# Patient Record
Sex: FEMALE | Race: WHITE
[De-identification: names, ages, dates, MRNs, and addresses within clinical notes are randomized per-mention and may not be internally consistent; named-entity substitution may affect disease eponyms.]

---

## 2017-08-16 PROBLEM — Z00.00 ENCOUNTER FOR PREVENTIVE HEALTH EXAMINATION: Status: ACTIVE | Noted: 2017-08-16

## 2017-08-17 ENCOUNTER — APPOINTMENT (OUTPATIENT)
Dept: ORTHOPEDIC SURGERY | Facility: CLINIC | Age: 42
End: 2017-08-17

## 2017-08-17 ENCOUNTER — APPOINTMENT (OUTPATIENT)
Dept: ORTHOPEDIC SURGERY | Facility: CLINIC | Age: 42
End: 2017-08-17
Payer: COMMERCIAL

## 2017-08-17 DIAGNOSIS — Z87.891 PERSONAL HISTORY OF NICOTINE DEPENDENCE: ICD-10-CM

## 2017-08-17 DIAGNOSIS — F41.9 ANXIETY DISORDER, UNSPECIFIED: ICD-10-CM

## 2017-08-17 DIAGNOSIS — Z82.49 FAMILY HISTORY OF ISCHEMIC HEART DISEASE AND OTHER DISEASES OF THE CIRCULATORY SYSTEM: ICD-10-CM

## 2017-08-17 PROCEDURE — 73620 X-RAY EXAM OF FOOT: CPT | Mod: LT

## 2017-08-17 PROCEDURE — 73610 X-RAY EXAM OF ANKLE: CPT | Mod: LT

## 2017-08-17 PROCEDURE — 99203 OFFICE O/P NEW LOW 30 MIN: CPT | Mod: 25

## 2017-08-17 PROCEDURE — 11055 PARING/CUTG B9 HYPRKER LES 1: CPT | Mod: LT

## 2017-08-17 RX ORDER — AZITHROMYCIN 250 MG/1
250 TABLET, FILM COATED ORAL
Qty: 6 | Refills: 0 | Status: COMPLETED | COMMUNITY
Start: 2017-05-19

## 2017-08-23 ENCOUNTER — APPOINTMENT (OUTPATIENT)
Dept: ORTHOPEDIC SURGERY | Facility: CLINIC | Age: 42
End: 2017-08-23

## 2017-08-29 ENCOUNTER — APPOINTMENT (OUTPATIENT)
Dept: ORTHOPEDIC SURGERY | Facility: CLINIC | Age: 42
End: 2017-08-29

## 2017-09-11 ENCOUNTER — APPOINTMENT (OUTPATIENT)
Dept: ORTHOPEDIC SURGERY | Facility: CLINIC | Age: 42
End: 2017-09-11
Payer: COMMERCIAL

## 2017-09-11 PROCEDURE — 99214 OFFICE O/P EST MOD 30 MIN: CPT

## 2017-09-11 PROCEDURE — 73630 X-RAY EXAM OF FOOT: CPT | Mod: LT

## 2017-10-10 ENCOUNTER — APPOINTMENT (OUTPATIENT)
Dept: ORTHOPEDIC SURGERY | Facility: CLINIC | Age: 42
End: 2017-10-10

## 2019-07-31 ENCOUNTER — APPOINTMENT (OUTPATIENT)
Dept: ORTHOPEDIC SURGERY | Facility: CLINIC | Age: 44
End: 2019-07-31
Payer: COMMERCIAL

## 2019-08-02 ENCOUNTER — APPOINTMENT (OUTPATIENT)
Dept: ORTHOPEDIC SURGERY | Facility: CLINIC | Age: 44
End: 2019-08-02
Payer: COMMERCIAL

## 2019-08-02 PROCEDURE — 73630 X-RAY EXAM OF FOOT: CPT | Mod: LT

## 2019-08-02 PROCEDURE — 99214 OFFICE O/P EST MOD 30 MIN: CPT

## 2019-08-02 RX ORDER — DOXYCYCLINE HYCLATE 100 MG/1
100 CAPSULE ORAL
Qty: 14 | Refills: 0 | Status: COMPLETED | COMMUNITY
Start: 2019-03-14 | End: 2019-08-02

## 2019-08-02 RX ORDER — CLINDAMYCIN HYDROCHLORIDE 300 MG/1
300 CAPSULE ORAL
Qty: 21 | Refills: 0 | Status: COMPLETED | COMMUNITY
Start: 2019-03-11 | End: 2019-08-02

## 2019-08-02 RX ORDER — HYDROCODONE BITARTRATE AND ACETAMINOPHEN 5; 325 MG/1; MG/1
5-325 TABLET ORAL
Qty: 16 | Refills: 0 | Status: COMPLETED | COMMUNITY
Start: 2019-03-14 | End: 2019-08-02

## 2019-08-02 NOTE — DISCUSSION/SUMMARY
[de-identified] : 43-year-old with prior cuboid fracture in the past now with No recent pain in the LEFT lateral hindfoot. I think she has some peroneal tendinopathy. There could be some aggravation of the calcaneocuboid arthritis. I do not suspect any new fractures.\par I suggested trying the brace wearing sneakers or a good supportive shoe. Warm soaks and ice and elevate as needed.\par Ibuprofen 400 mg 2-3 times a day temporarily.\par Followup in 3-4 weeks.

## 2019-08-02 NOTE — PROCEDURE
[de-identified] : She was fitted for an ASO type ankle brace for support which she found good and comfortable

## 2019-08-02 NOTE — HISTORY OF PRESENT ILLNESS
[de-identified] : Ms. Forbes comes in for Recurrent pain in her LEFT foot.\par On Tuesday she walked in a flat shoe and had pain in the left lateral foot and ankle and heel to the lateral distal 5th metatarsal.\par She is using a crutch to help her walk. She feels better walking barefoot than in a shoe.\par She had done a lot of PT after I saw her 2 yrs ago. She would have a little soreness.Her symptoms have never gone away completely but her function was good and she really wasn't having any significant difficulty or pain\par She Started taking 2 Advil / day for the acute pain.

## 2019-08-20 ENCOUNTER — APPOINTMENT (OUTPATIENT)
Dept: ORTHOPEDIC SURGERY | Facility: CLINIC | Age: 44
End: 2019-08-20
Payer: COMMERCIAL

## 2019-08-20 PROCEDURE — 99214 OFFICE O/P EST MOD 30 MIN: CPT

## 2019-08-20 NOTE — HISTORY OF PRESENT ILLNESS
[de-identified] : Ms. Forbes comes in for followup for her LEFT foot and ankle pain. She was seen about 2-1/2 weeks ago. She wore the brace and sneakers and took some ibuprofen and ice. Her foot is feeling partially better.\par She is also walking with the crutches partial weightbearing a lot at that time. Sometimes it feels better walking barefoot than with the brace.\par Last Saturday she had a lot of pain. It was the worse that it's been.\par She is taking Advil BID \par Pain is all along the lateral hindfoot or forefoot

## 2019-08-20 NOTE — PHYSICAL EXAM
[LE] : Sensory: Intact in bilateral lower extremities [DP] : dorsalis pedis 2+ and symmetric bilaterally [PT] : posterior tibial 2+ and symmetric bilaterally [Normal RLE] : Right Lower Extremity: No scars, rashes, lesions, ulcers, skin intact [Normal LLE] : Left Lower Extremity: No scars, rashes, lesions, ulcers, skin intact [Normal Touch] : sensation intact for touch [Normal] : Oriented to person, place, and time, insight and judgement were intact and the affect was normal [de-identified] : lEFT  Foot and Ankle: \par She can walk a short distance with mild lateral foot pain.\par No Edema, ecchymoses, erythema.\par Ankle range of motion is with 5 degrees dorsiflexion and 35-40 degrees plantar flexion.\par Subtalar motion severe pain on inversion of the hindfoot at the lateral cranial cuboid joint.\par Hallux MP joint range of motion .  pain with ROM.\par Tender lateral calcaneal cuboid joint. Less tender on the lateral peroneal tendons over the calcaneus and cuboid.\par Prominent cuboid fracture malunion.\par Motor: 5/5 ATT, GS , EHL, PTT/inversion, peroneals/eversion.\par Normal neurovascular exam\par  [de-identified] : N [de-identified] : \par X-rays done last visit showed the arthritis and posttraumatic change in the cuboid

## 2019-08-20 NOTE — DISCUSSION/SUMMARY
[de-identified] : 44-year-old with a history of a prior cuboid Fracture with slight malunion at least 15 years ago and mild Secondary calcaneal cuboid arthritis with recent pain. Her pain really seems to be in the lateral hindfoot but does radiate proximally and distally. Given the severity of her pain that doesn't seem to be responding to the treatment in the last few weeks we will get an MRI to make sure there is no acute fracture. She was fitted for a short walking boot today and she should try using the boot with crutches as opposed to the brace with her crutches. Elevate and ice. Warm soaks. Continue ibuprofen. I will call her with the MRI results and further recommendations. If there is no acute injury then I may want to refer her to physical therapy.

## 2019-08-27 ENCOUNTER — RX RENEWAL (OUTPATIENT)
Age: 44
End: 2019-08-27

## 2019-09-17 ENCOUNTER — APPOINTMENT (OUTPATIENT)
Dept: ORTHOPEDIC SURGERY | Facility: CLINIC | Age: 44
End: 2019-09-17
Payer: COMMERCIAL

## 2019-09-17 VITALS — BODY MASS INDEX: 26.66 KG/M2 | WEIGHT: 160 LBS | HEIGHT: 65 IN

## 2019-09-17 DIAGNOSIS — L84 CORNS AND CALLOSITIES: ICD-10-CM

## 2019-09-17 DIAGNOSIS — M65.9 SYNOVITIS AND TENOSYNOVITIS, UNSPECIFIED: ICD-10-CM

## 2019-09-17 DIAGNOSIS — M25.572 PAIN IN LEFT ANKLE AND JOINTS OF LEFT FOOT: ICD-10-CM

## 2019-09-17 DIAGNOSIS — B07.0 PLANTAR WART: ICD-10-CM

## 2019-09-17 PROCEDURE — 99214 OFFICE O/P EST MOD 30 MIN: CPT

## 2019-09-17 NOTE — HISTORY OF PRESENT ILLNESS
[de-identified] : Ally comes in for followup for her LEFT foot where she has posttraumatic arthritis and bony prominence from a fracture many years ago. It has been intermittently symptomatic over the years and started to bother her again about 5 or 6 weeks ago.\par I have her wear the boot temporarily to help it quiet down. She was referred to physical therapy.\par The ongoing pain she went for an MRI August 21, 2019 which showed posttraumatic deformity at the calcaneal cuboid joint dorsally and a grade 1 sprain ATFL. Peroneal tendons were unremarkable.\par Her foot is feeling better.She still has a little discomfort in the lateral foot. There is 3 different sites of pain including the lateral hindfoot, lateral forefoot and along the fifth metatarsal and around the base of the second and third toes.\par On Sept 3 she stopped using the boot. She wears the brace.\par she is wearing sneakers. Her sneakers are not that supportive.

## 2019-09-17 NOTE — PROCEDURE
[de-identified] : \par Pared down callus plantar lateral foot built up around plantars wart Using a #10 blade scalpel

## 2019-09-17 NOTE — PHYSICAL EXAM
[de-identified] : LEFT  Foot and Ankle: \par Gait is not antalgic.\par No deformity of the toes. Chronic bony prominence dorsal lateral cuboid which is not as tender.\par There may be subtle Edema Mid forefoot at the base of the second and third toes. No ecchymoses, erythema.\par Ankle range of motion is with 5 degrees dorsiflexion and 30 degrees plantar flexion.\par Subtalar motion gently tested without pain.\par Hallux MP joint range of motion .  pain with ROM.\par Tender Second and third MP joints.Tender over the plantar was bored with significant callus under the fifth metatarsal head and mildly along the fifth metatarsal. Mildly tender at the cuboid metatarsal joint.\par Motor: 5/5 ATT, GS , EHL, PTT/inversion, peroneals/eversion.\par Normal neurovascular exam\par  [de-identified] : \par We again discussed the MRI results LEFT ankle from August 21, 2019. She was given a copy

## 2019-09-17 NOTE — DISCUSSION/SUMMARY
[de-identified] : 44-year-old LEFT ankle and foot pain. The hindfoot pain associated with her fracture malunion and some hindfoot arthritis at the cuboid metatarsal articulation is feeling much better. I think it's good she is out of the boot. She can use the brace as needed. She should wear a more supportive shoe and we discussed various types of shoes to consider but basically a more rigid, rocker but also supporting cushion. She should not have anything tight with the bone is more prominent.\par She has a plantars wart with significant callus built up. She had seen a dermatologist and has medication to apply but she hasn't been using it. I shaved down a lot of the built up callus over it he does I think it's causing increased pressure on the fifth metatarsal and adding to her pain. She should apply the medication. If it's not going away she may want to see the dermatologist again.\par Finally she has some pain around the base of the second and third toes which is likely some synovitis in prep she had a mild sprain of the plantar plate. This pain she states started before the boot. The boot can help the pain but also cause stiffness of these joints. A more stiff supportive shoes such as a rigid rocker she would be better. I tried taping the toes plantarly but this caused more pain. She can do some warm soaks and ice. I gave her some exercises with stretching the calf muscles and picking things up with her toes to strengthen him. Ibuprofen as needed\par Followup in 6 weeks if she's not better.

## 2019-09-27 ENCOUNTER — CLINICAL ADVICE (OUTPATIENT)
Age: 44
End: 2019-09-27

## 2019-10-18 ENCOUNTER — APPOINTMENT (OUTPATIENT)
Dept: ORTHOPEDIC SURGERY | Facility: CLINIC | Age: 44
End: 2019-10-18
Payer: COMMERCIAL

## 2019-10-18 VITALS — WEIGHT: 160 LBS | BODY MASS INDEX: 26.66 KG/M2 | HEIGHT: 65 IN

## 2019-10-18 DIAGNOSIS — Z78.9 OTHER SPECIFIED HEALTH STATUS: ICD-10-CM

## 2019-10-18 PROCEDURE — 73630 X-RAY EXAM OF FOOT: CPT | Mod: RT

## 2019-10-18 PROCEDURE — 99214 OFFICE O/P EST MOD 30 MIN: CPT

## 2019-10-18 NOTE — ASSESSMENT
[FreeTextEntry1] : 44-year-old with posttraumatic deformity from prior fracture near the calcaneocuboid joint LEFT foot now has RIGHT foot pain after a minor slip about 5 weeks ago where she came down hard on the foot.\par Her exam is unremarkable aside from tenderness on the fourth metatarsal. X-rays are negative. I suspect that there is a stress injury in the fourth metatarsal. We talked about protecting the RIGHT foot with either her short walking boot that she was using for the LEFT versus a surgical shoe or stiff supportive shoe. She has been wearing a sneaker. I think she should try the surgical shoes since its later and may not throw off her other foot. She will stay home from work for about 10 days as well so she doesn't have to commute. She should try to get a rigid rocker shoe. If the boot feels good she may need to use it temporarily until this starts healing. Heat and ice as needed. When she is sitting and lying down she can move her ankle to prevent stiffness and for circulation.\par Followup in 3 weeks.

## 2019-10-18 NOTE — HISTORY OF PRESENT ILLNESS
[de-identified] : Ally comes in for followup for her LEFT foot and new pain in the right foot that started 9/13/19 when she slipped and stopped hard on the right foot.she's had ongoing pain in the foot that got very bad last week when she was lying on a plane.She's been wearing sneakers. Pain initially was in the lateral forefoot but now is radiating Sometimes to the lateral Hindfoot.\par \par The MRI left almost 2 months ago showed no stress fractures. There was mild talonavicular arthritis with a dorsal osteophyte and remote posttraumatic deformity at the dorsal calcaneal cuboid joint with osseous productive change at the anterior calcaneal process.\par She was up walking boot and the pain did subside. The LEFT foot is feeling relatively good. She is wearing sneakers. She obviously has some concern that the pain may get worse again especially now that the RIGHT foot started to hurt.

## 2019-10-18 NOTE — PHYSICAL EXAM
[Normal RLE] : Right Lower Extremity: No scars, rashes, lesions, ulcers, skin intact [Normal LLE] : Left Lower Extremity: No scars, rashes, lesions, ulcers, skin intact [Normal Touch] : sensation intact for touch [de-identified] : Bilateral feet \par Gait is not antalgic short distance but she feels some pain on the RIGHT.\par possibly trace edema in the RIGHT forefoot.No LEFT Edema. No ecchymoses, erythema.\par Ankle range of motion is with 7 degrees dorsiflexion and 40 degrees plantar flexion.\par Subtalar motion intact without pain bilaterally.\par Tender RIGHT fourth metatarsal greatest around the midshaft.\par RIGHT foot has the Bony prominence lateral hindfoot near CC joint. No other Deformity.\par Motor: 5/5 ATT, GS , EHL, PTT/inversion, peroneals/eversion.\par Normal neurovascular exam\par  [de-identified] : \par X-rays of the RIGHT foot weightbearing 3 views today show no visible fractures. Normal alignment. No bone lesions.

## 2019-11-08 ENCOUNTER — APPOINTMENT (OUTPATIENT)
Dept: ORTHOPEDIC SURGERY | Facility: CLINIC | Age: 44
End: 2019-11-08
Payer: COMMERCIAL

## 2019-11-08 PROCEDURE — 99214 OFFICE O/P EST MOD 30 MIN: CPT

## 2019-11-08 NOTE — DISCUSSION/SUMMARY
[de-identified] : 44-year-old old cuboid fracture with some secondary osteoarthritis at the calcaneocuboid joint. Recent RIGHT foot pain after a contusion And favoring the LEFT foot which was in the boot for about 6-8 weeks. She's been using crutches and wearing either a surgical shoe, boot were buried rigid rocker shoes. She actually bought 6 pairs of shoes that are stiff and supportive clearly demonstrating how desperate she is.\par \par Vit D \par MRI both feet.\par I will call her With the MRI results. In the meantime she can continue with the surgical shoe or stiff shoes and crutches. Minimize walking. Nonweightbearing exercise.\par Followup 4 weeks

## 2019-11-08 NOTE — PHYSICAL EXAM
[de-identified] : Bilateral feet and ankles\par He is walking very carefully and gets intermittent pains in both feet when walking short distance\par Mild forefoot Edema without ecchymoses, erythema.\par Ankle range of motion is with 5 degrees dorsiflexion and 35 LEFT and 40 RIGHT degrees plantar flexion.\par Subtalar motion Mildly decreased in the LEFT..\par tender bilateral feet on the RIGHT greatest along the fourth metatarsal and mildly at the fourth tarsometatarsal joint. On the LEFT side there is tenderness over the fifth and fourth metatarsals and tarsometatarsal joints.\par  prominence bone where she had prior frock showing the lateral LEFT hindfoot. No other deformity\par Motor: 5/5 ATT, GS , EHL, PTT/inversion, peroneals/eversion.\par Normal neurovascular exam\par  [de-identified] : \par x-rays bilateral feet haven't shown any new stress fractures.

## 2019-11-08 NOTE — HISTORY OF PRESENT ILLNESS
[de-identified] : 44-year-old bilateral foot issues. She's very frustrated because of ongoing pain in both feet. The LEFT which was feeling better feels worse again with more pain in the mid forefoot area. The MRI of her ankle did not show stress fractures but showed some degeneration in the lateral hind foot from prior trauma.The RIGHT foot is hurting still\par She wore the boot 2 days and worked from home for 2 wks.because wearing the boot on the RIGHT side caused more pain on the LEFT she decided not to wear the boot.\par She got rigid rocker shoes and surgical shoe and using crutches.\par She is having pain in both feet greatest in the forefoot area.Minimal swelling.She spoke to her internist and ordered a DEXA scan to check her bone density because she doesn't understand why she get a stress injury with so little trauma or activity.\par She has had low vitamin D in the past. She started taking vitamin D again.\par She has been wearing

## 2019-12-02 PROBLEM — M79.672 LEFT FOOT PAIN: Status: ACTIVE | Noted: 2017-08-17

## 2019-12-02 PROBLEM — M84.374A STRESS FRACTURE OF METATARSAL BONE OF RIGHT FOOT, INITIAL ENCOUNTER: Status: RESOLVED | Noted: 2019-10-18 | Resolved: 2019-12-02

## 2019-12-03 ENCOUNTER — APPOINTMENT (OUTPATIENT)
Dept: ORTHOPEDIC SURGERY | Facility: CLINIC | Age: 44
End: 2019-12-03
Payer: COMMERCIAL

## 2019-12-03 VITALS — HEIGHT: 65 IN | WEIGHT: 160 LBS | BODY MASS INDEX: 26.66 KG/M2

## 2019-12-03 DIAGNOSIS — M84.374A STRESS FRACTURE, RIGHT FOOT, INITIAL ENCOUNTER FOR FRACTURE: ICD-10-CM

## 2019-12-03 DIAGNOSIS — M79.672 PAIN IN LEFT FOOT: ICD-10-CM

## 2019-12-03 PROCEDURE — 99214 OFFICE O/P EST MOD 30 MIN: CPT

## 2019-12-03 NOTE — HISTORY OF PRESENT ILLNESS
[de-identified] : Ally comes in for followup for her feet and ankles.\par She gets stinging pain in the mid to lateral forefoot.\par Sometimes on the left she gets a bulge/swelling in the right midfoot.\par She takes Advil intermittently, not daily.\par It hurts to have covers on the foot.\par She uses the crutches.\par she still has been using the crutches partial weightbearing and limiting her walking. I have called her with the MRI results of the weeks ago and let her know that there is no sign of any stress fracture on either foot

## 2019-12-03 NOTE — PHYSICAL EXAM
[de-identified] : \par MRI RIGHT foot on November 11, 2019 showed no stress fractures or Lisfranc injury. There was a small neuroma in the third web space and some intermetatarsal bursitis first and second web spaces\par \par MRI LEFT foot November 11, 2019 showed no fractures  Old trauma deformity anterior process calcaneus, peroneus longus tendinosis, intermetatarsal bursitis first and second web spaces and small neuroma third web space [de-identified] : Bilateral feet \par Gait is not antalgic.\par The patient is able to walk on heels and toes \par no Edema, ecchymoses, erythema.\par Ankle range of motion is with 8 degrees dorsiflexion and  35 degrees plantar flexion.\par Subtalar motion Intact without pain.\par Hallux MP joint range of motion .  pain with ROM.\par Tender Around the fourth and fifth web spaces RIGHT greater than LEFT and relatively mild.\par Nontender first and second web spaces..\par Negative Kayla's click bilateral second and third web spaces.\par Prominence of the Anterior process of the calcaneus on the LEFT. No other Deformity.\par Motor: 5/5 ATT, GS , EHL, PTT/inversion, peroneals/eversion.\par Normal neurovascular exam\par

## 2019-12-03 NOTE — ASSESSMENT
[FreeTextEntry1] : 44-year-old bilateral foot pain. Her pain started in the LEFT lateral hindfoot where she has some posttraumatic change from a fracture that was displaced mid years ago. That seems much better now. There is some peroneal tendinopathy without tearing as well on the LEFT that is causing perhaps some intermittent symptoms but not her main pain. She's had pain in the forefoot bilaterally more lateral than medial but not clearly related to neuroma seen on the MRI. She doesn't have the Obvious examination findings of a neuroma but there is some tenderness and perhaps the neuroma is causing some of his pain. I think she's also not walking quite normally and has been favoring her feet for some time which could be aggravating the foot pain. I referred her to physical therapy. I encouraged her not to use the crutches. She should wear good supportive shoes. She is getting custom orthotics made which hopefully will be helpful. She could use a neuroma pad that helpful.Warm soaks and ice.\par If she is having ongoing pain that seems consistent with a neuroma we could try corticosteroid injection. Right now he would have her take 2 Aleve a day for a week or 2 and see if this helps her pain and any inflammation. Followup in the next couple months if it's not getting better.

## 2020-01-13 PROBLEM — G57.61 MORTON'S NEUROMA OF THIRD INTERSPACE OF RIGHT FOOT: Status: ACTIVE | Noted: 2019-12-03

## 2020-01-13 PROBLEM — M79.671 RIGHT FOOT PAIN: Status: ACTIVE | Noted: 2019-10-18

## 2020-01-13 PROBLEM — M67.88 LEFT PERONEAL TENDINOSIS: Status: ACTIVE | Noted: 2017-08-17

## 2020-01-13 PROBLEM — S92.212P: Status: ACTIVE | Noted: 2017-08-17

## 2020-01-13 PROBLEM — G57.62 MORTON'S NEUROMA OF THIRD INTERSPACE OF LEFT FOOT: Status: ACTIVE | Noted: 2019-12-03

## 2020-01-14 ENCOUNTER — APPOINTMENT (OUTPATIENT)
Dept: ORTHOPEDIC SURGERY | Facility: CLINIC | Age: 45
End: 2020-01-14

## 2020-01-14 DIAGNOSIS — S92.212P: ICD-10-CM

## 2020-01-14 DIAGNOSIS — G57.62 LESION OF PLANTAR NERVE, LEFT LOWER LIMB: ICD-10-CM

## 2020-01-14 DIAGNOSIS — G57.61 LESION OF PLANTAR NERVE, RIGHT LOWER LIMB: ICD-10-CM

## 2020-01-14 DIAGNOSIS — M67.88 OTHER SPECIFIED DISORDERS OF SYNOVIUM AND TENDON, OTHER SITE: ICD-10-CM

## 2020-01-14 DIAGNOSIS — M79.671 PAIN IN RIGHT FOOT: ICD-10-CM

## 2022-02-15 ENCOUNTER — APPOINTMENT (OUTPATIENT)
Dept: ORTHOPEDIC SURGERY | Facility: AMBULATORY SURGERY CENTER | Age: 47
End: 2022-02-15
Payer: COMMERCIAL

## 2022-02-15 DIAGNOSIS — M25.511 PAIN IN RIGHT SHOULDER: ICD-10-CM

## 2022-02-15 DIAGNOSIS — G89.29 PAIN IN RIGHT SHOULDER: ICD-10-CM

## 2022-02-15 PROCEDURE — 99214 OFFICE O/P EST MOD 30 MIN: CPT

## 2022-02-15 PROCEDURE — 73564 X-RAY EXAM KNEE 4 OR MORE: CPT | Mod: 26,RT

## 2022-02-15 NOTE — HISTORY OF PRESENT ILLNESS
[de-identified] : Ally is now 47 years old and comes in for a new injury. She injured her right knee 2 weeks ago. She was cleaning out her car and knee was in a deep bend and twisted position and she felt knee "move" and possibly pop. She rates her pain as a 1-2/10. Not sharp pain but has tenderness around patella. \par The knee never swelled at all.  It was never terribly painful but seems to be lingering and possibly getting a little worse with pain in the anteromedial side of her knee.  No prior knee issues.\par She also has had some intermittent and somewhat chronic right TMJ pain and neck pain and periscapular pain all on the right side which has been an issue recently.  A few years ago she had gone to therapy for it but because of COVID she has not gone recently.\par She has not taken any medication for pain.  She has had some imaging in the past and there were some disc bulges.  Occasionally pain goes down her entire right arm and it feels weaker temp momentarily.  She has had CT scans and imaging in the past and will try to send that to me.\par She was last seen over 2 years ago for feet and ankles chronic intermittent issue. She is feeling better but still some residual pain. \par

## 2022-02-15 NOTE — REASON FOR VISIT
[Initial Visit] : an initial visit for [Knee Pain] : knee pain [FreeTextEntry2] : Right scapular pain

## 2022-02-15 NOTE — ASSESSMENT
[FreeTextEntry1] : 46 y/o female with right knee pain after she had an acute event 2 weeks ago with her knee flexed and possibly twisted on it.  Pain seems to be at the patellofemoral joint but no evidence of any dislocation or significant trauma.  No joint swelling.  The knee feels stable on exam.  I recommended some home exercises, ice, NSAIDs and physical therapy.  I would expect it to feel better in the next month.  If she has ongoing pain she should let me know and we will image further likely with an MRI.\par She has had right periscapular pain on and off for years which has been bothering her more.  This may be connected to cervical spine disease and she will get me prior imaging she has had.  She also has TMJ syndrome which seems to trigger the pain.  She will resume physical therapy which has helped in the past.  Moist heat and ice and NSAIDs can help.  If she has ongoing pain in the periscapular region we could try a trigger point injection.\par She will work on posture.  Standing desk can be helpful as well.  We also talked about sleeping position and mattresses.\par She will follow-up as needed

## 2022-02-15 NOTE — PHYSICAL EXAM
[LE] : Sensory: Intact in bilateral lower extremities [Normal RLE] : Right Lower Extremity: No scars, rashes, lesions, ulcers, skin intact [Normal Touch] : sensation intact for touch [Normal LLE] : Left Lower Extremity: No scars, rashes, lesions, ulcers, skin intact [Normal] : Gait: normal [de-identified] : Right knee with left for comparison\par No effusion.  No edema, ecchymoses.\par Knee range of motion 0 to 140 degrees flexion without pain.  Similar bilaterally.\par Tender medial patella facet.  Nontender medial and lateral joint lines.\par Negative apprehension.  No significant patella instability.\par 1 a Lachman right with possibly slight more excursion than the left but good endpoint.\par Negative anterior and posterior drawer.  Normal rotational laxity\par Normal patella mobility\par Negative varus and valgus instability without pain on varus and valgus stress.\par Normal gait.\par \par Cervical spine and scapula\par Intact range of motion cervical spine no pain with extension and rotation.\par Tender in the right medial periscapular muscles more upper than lower.\par Shoulder range of motion is normal and without any pain\par 5/5 supraspinatus, internal and external rotation.\par Motor and sensation intact upper extremity. [de-identified] : \par xrays of right knee weightbearing 4 views today are unremarkable\par

## 2022-07-12 ENCOUNTER — APPOINTMENT (OUTPATIENT)
Dept: ORTHOPEDIC SURGERY | Facility: AMBULATORY SURGERY CENTER | Age: 47
End: 2022-07-12

## 2022-07-12 DIAGNOSIS — M25.561 PAIN IN RIGHT KNEE: ICD-10-CM

## 2022-07-12 PROCEDURE — 99214 OFFICE O/P EST MOD 30 MIN: CPT

## 2022-07-12 PROCEDURE — 73502 X-RAY EXAM HIP UNI 2-3 VIEWS: CPT | Mod: LT

## 2022-07-12 NOTE — ASSESSMENT
[FreeTextEntry1] : 47 year old with acute pain in the left hip that started while she was dancing without her specific injury about 2 and half days ago.  It was much more painful initially and she is starting to be able to walk better.\par I think she strained her gluteus medius or one of the other gluteal tendon/muscle.\par I recommended resting for the next few weeks relatively speaking.  She should walk with a cane which gave her a lot of support and allowed her to walk without much pain.  She should limit activity and exercise.\par She can take ibuprofen or Tylenol as needed.\par To start physical therapy in about 2 weeks working on the glutes but also can work on her knee on the other side doing strengthening on both lower extremities since she never did the therapy for her right patellofemoral pain.\par Follow-up in about 4 weeks to check and make sure she is getting better.  If she is having a lot of ongoing pain or weakness then we may get an MRI.  There is a small chance that she had a significant tear of the gluteal tendons and it does not heal clinically that she could need surgery.

## 2022-07-12 NOTE — PHYSICAL EXAM
[LE] : Sensory: Intact in bilateral lower extremities [Normal RLE] : Right Lower Extremity: No scars, rashes, lesions, ulcers, skin intact [Normal LLE] : Left Lower Extremity: No scars, rashes, lesions, ulcers, skin intact [Normal Touch] : sensation intact for touch [Normal] : Oriented to person, place, and time, insight and judgement were intact and the affect was normal [de-identified] : Left hip\par Coxalgia gait.\par Tender over the gluteus medius primarily down to the top of the greater trochanter.\par Mildly tender more posterior and gluteal.  Nontender greater trochanter.\par Passive range of motion of the left hip is with mild pain with about 135 degrees flexion and 10 degrees internal rotation and 40 degrees external rotation with pain.\par Mild pain and weakness with resisted straight leg raise.  \par More painful and weak with hip abduction about 3 -/5 strength.\par No leg length discrepancy.\par Motor and sensation are intact distally. [de-identified] : \par \par X-rays taken of hip AP and lateral views show no abnormalities

## 2022-07-12 NOTE — HISTORY OF PRESENT ILLNESS
[de-identified] : Ms. Forbes is now 47 years old and comes in for evaluation for left hip pain that began Saturday she had a lot to drink and had been dancing somewhat aggressively and then felt pain in the left hip area and had difficulty walking.  The day after she could not walk without holding onto things.  Next day felt a little better and today she was able to walk 3 blocks but is limping.  She is taking 3 Advil twice a day since it happened.  Her left hip feels stiff.  Pain is in the buttock, lateral hip and into the groin region.  No prior hip problems.  She states that the way she is walking is aggravating the knee on the contralateral side.  I had seen her for knee pain but she had never gone to physical therapy for it.

## 2022-08-04 ENCOUNTER — APPOINTMENT (OUTPATIENT)
Dept: ORTHOPEDIC SURGERY | Facility: CLINIC | Age: 47
End: 2022-08-04

## 2022-08-04 DIAGNOSIS — S76.012A STRAIN OF MUSCLE, FASCIA AND TENDON OF LEFT HIP, INITIAL ENCOUNTER: ICD-10-CM

## 2022-08-04 PROCEDURE — 99214 OFFICE O/P EST MOD 30 MIN: CPT

## 2022-08-04 NOTE — ASSESSMENT
[FreeTextEntry1] : 47 year old who had a right hip injury about 3 weeks ago that looks most consistent with gluteal strain possible the gluteus medius tendon and into the gluteus medius and bear muscles.  She has improved significantly since a few weeks ago but there is still pain and weakness and mild limping, all significantly better.\par When I spoke to her a few months ago on the phone I did order an MRI because she was having so much pain.  It has improved since then but given the ongoing pain she could still go for the MRI to evaluate further\par She should get MRI in 2 weeks if she is not feeling better\par \par She should continue to use the cane if she is doing a lot of walking or having pain.  Slowly she can wean off the cane as tolerated.  Continue physical therapy progressing as tolerated.\par Follow-up in about 4 weeks.  I will call her when I get the results of the MRI.

## 2022-08-04 NOTE — HISTORY OF PRESENT ILLNESS
[de-identified] : Ms. Forbes is 47 years old and comes in for f/u evaluation for left hip injury that occurred about 3 wks ago when dancing. She has had a lot of improvement since last visit. She has occasional weakness. She started physical therapy last week and is working on gait training and strength. She has been using a cane but would like to stop since she is starting to feel better. She did not get MRI yet. She takes 2 Advil before bed.\par She had really limited her activity in the last few weeks but would like to start doing more\par \par

## 2022-08-04 NOTE — PHYSICAL EXAM
[LE] : Sensory: Intact in bilateral lower extremities [Normal RLE] : Right Lower Extremity: No scars, rashes, lesions, ulcers, skin intact [Normal LLE] : Left Lower Extremity: No scars, rashes, lesions, ulcers, skin intact [Normal Touch] : sensation intact for touch [Normal] : Oriented to person, place, and time, insight and judgement were intact and the affect was normal [de-identified] : Left hip\par She can walk without the cane much better but still with a slight limp\par Tender over the gluteus medius into the gluteus bear muscle from the tip of the greater trochanter proximally into the buttock..\par Mildly tender greater trochanter.\par Passive range of motion of the left hip is with mild pain with about 135 degrees flexion and 10 degrees internal rotation and 40 degrees external rotation without any significant pain.\par Mild pain and minimal weakness with resisted straight leg raise.  \par Mild weakness hip abduction about 4+/5 strength.\par There is slight asymmetry in her waist and hips.  The left leg and ankle slightly longer than the right her radiographs there is a subtle curvature of the\par Motor and sensation are intact distally. [de-identified] : \par \par X-rays taken July 12, 2022 of hip AP and lateral views show no abnormalities

## 2022-09-07 ENCOUNTER — APPOINTMENT (OUTPATIENT)
Dept: ORTHOPEDIC SURGERY | Facility: CLINIC | Age: 47
End: 2022-09-07

## 2025-01-19 NOTE — PHYSICAL EXAM
[LE] : Sensory: Intact in bilateral lower extremities [DP] : dorsalis pedis 2+ and symmetric bilaterally [PT] : posterior tibial 2+ and symmetric bilaterally [Normal RLE] : Right Lower Extremity: No scars, rashes, lesions, ulcers, skin intact [Normal LLE] : Left Lower Extremity: No scars, rashes, lesions, ulcers, skin intact [Normal Touch] : sensation intact for touch [Normal] : Oriented to person, place, and time, insight and judgement were intact and the affect was normal [de-identified] :  LEFT Foot and Ankle /RIGHT for comparison\par Gait is slightly antalgic- discomfort LEFT lateral ankle.\par The patient is able to walk on heels and toes but has pain in the LEFT heel walking on the heels\par no Edema, ecchymoses, erythema.\par Ankle range of motion is with  degrees dorsiflexion and  degrees plantar flexion.\par Subtalar motion intact without limitation.\par Mildly tender along the peroneal tendons which feel intact. There is bony prominence over the dorsal navicular which is chronic. There is mild tenderness. Tender near the calcaneal cuboid and mildly at the cuboid fifth metatarsal articulations.. Mildly tender sinus Tarsi\par Motor: 5/5 ATT, GS , EHL, PTT/inversion, peroneals/eversion.\par Normal neurovascular exam\par  [Slightly Antalgic] : slightly antalgic [de-identified] : No respiratory distress [de-identified] : \par X-rays LEFT foot and ankle weightbearing 3 views today show lateral cuboid and calcaneocuboid sclerosis consistent with prior fracture.Talar beaking.\par No acute fractures seen. Mild degeneration at the calcaneocuboid joint. Alert and oriented to person, place, time/situation.